# Patient Record
Sex: MALE | Race: WHITE
[De-identification: names, ages, dates, MRNs, and addresses within clinical notes are randomized per-mention and may not be internally consistent; named-entity substitution may affect disease eponyms.]

---

## 2017-02-02 ENCOUNTER — HOSPITAL ENCOUNTER (OUTPATIENT)
Dept: HOSPITAL 47 - MMGSC | Age: 57
Discharge: HOME | End: 2017-02-02
Payer: MEDICAID

## 2017-02-02 DIAGNOSIS — Z87.39: ICD-10-CM

## 2017-02-02 DIAGNOSIS — I10: ICD-10-CM

## 2017-02-02 DIAGNOSIS — E11.9: Primary | ICD-10-CM

## 2017-02-02 DIAGNOSIS — E78.00: ICD-10-CM

## 2017-02-02 LAB
ALP SERPL-CCNC: 74 U/L (ref 38–126)
ALT SERPL-CCNC: 56 U/L (ref 21–72)
ANION GAP SERPL CALC-SCNC: 15 MMOL/L
AST SERPL-CCNC: 32 U/L (ref 17–59)
BASOPHILS # BLD AUTO: 0.1 K/UL (ref 0–0.2)
BASOPHILS NFR BLD AUTO: 1 %
BUN SERPL-SCNC: 13 MG/DL (ref 9–20)
CALCIUM SPEC-MCNC: 10 MG/DL (ref 8.4–10.2)
CH: 29.9
CHCM: 34.2
CHLORIDE SERPL-SCNC: 104 MMOL/L (ref 98–107)
CHOLEST SERPL-MCNC: 171 MG/DL (ref ?–200)
CO2 SERPL-SCNC: 23 MMOL/L (ref 22–30)
EOSINOPHIL # BLD AUTO: 0.3 K/UL (ref 0–0.7)
EOSINOPHIL NFR BLD AUTO: 4 %
ERYTHROCYTE [DISTWIDTH] IN BLOOD BY AUTOMATED COUNT: 4.93 M/UL (ref 4.3–5.9)
ERYTHROCYTE [DISTWIDTH] IN BLOOD: 13.3 % (ref 11.5–15.5)
GLUCOSE SERPL-MCNC: 192 MG/DL (ref 74–99)
HCT VFR BLD AUTO: 43.3 % (ref 39–53)
HDLC SERPL-MCNC: 47 MG/DL (ref 40–60)
HDW: 2.77
HEMOGLOBIN A1C: 8.9 % (ref 4.2–6.1)
HGB BLD-MCNC: 14.5 GM/DL (ref 13–17.5)
LUC NFR BLD AUTO: 2 %
LYMPHOCYTES # SPEC AUTO: 2.2 K/UL (ref 1–4.8)
LYMPHOCYTES NFR SPEC AUTO: 30 %
MCH RBC QN AUTO: 29.4 PG (ref 25–35)
MCHC RBC AUTO-ENTMCNC: 33.5 G/DL (ref 31–37)
MCV RBC AUTO: 87.9 FL (ref 80–100)
MONOCYTES # BLD AUTO: 0.4 K/UL (ref 0–1)
MONOCYTES NFR BLD AUTO: 5 %
NEUTROPHILS # BLD AUTO: 4.2 K/UL (ref 1.3–7.7)
NEUTROPHILS NFR BLD AUTO: 58 %
NON-AFRICAN AMERICAN GFR(MDRD): >60
POTASSIUM SERPL-SCNC: 4.5 MMOL/L (ref 3.5–5.1)
PROT SERPL-MCNC: 7.9 G/DL (ref 6.3–8.2)
SODIUM SERPL-SCNC: 142 MMOL/L (ref 137–145)
TRIGL SERPL-MCNC: 167 MG/DL (ref ?–150)
URATE SERPL-MCNC: 6.9 MG/DL (ref 3.5–8.5)
WBC # BLD AUTO: 0.15 10*3/UL
WBC # BLD AUTO: 7.2 K/UL (ref 3.8–10.6)
WBC (PEROX): 7.27

## 2017-02-02 PROCEDURE — 80061 LIPID PANEL: CPT

## 2017-02-02 PROCEDURE — 84439 ASSAY OF FREE THYROXINE: CPT

## 2017-02-02 PROCEDURE — 84550 ASSAY OF BLOOD/URIC ACID: CPT

## 2017-02-02 PROCEDURE — 82043 UR ALBUMIN QUANTITATIVE: CPT

## 2017-02-02 PROCEDURE — 84443 ASSAY THYROID STIM HORMONE: CPT

## 2017-02-02 PROCEDURE — 80053 COMPREHEN METABOLIC PANEL: CPT

## 2017-02-02 PROCEDURE — 85025 COMPLETE CBC W/AUTO DIFF WBC: CPT

## 2017-02-02 PROCEDURE — 83036 HEMOGLOBIN GLYCOSYLATED A1C: CPT

## 2017-02-02 PROCEDURE — 36415 COLL VENOUS BLD VENIPUNCTURE: CPT

## 2017-11-06 ENCOUNTER — HOSPITAL ENCOUNTER (OUTPATIENT)
Dept: HOSPITAL 47 - MMGSC | Age: 57
Discharge: HOME | End: 2017-11-06
Payer: MEDICAID

## 2017-11-06 DIAGNOSIS — E78.5: ICD-10-CM

## 2017-11-06 DIAGNOSIS — E11.9: Primary | ICD-10-CM

## 2017-11-06 LAB
ALP SERPL-CCNC: 82 U/L (ref 38–126)
ALT SERPL-CCNC: 68 U/L (ref 21–72)
ANION GAP SERPL CALC-SCNC: 13 MMOL/L
AST SERPL-CCNC: 30 U/L (ref 17–59)
BASOPHILS # BLD AUTO: 0.1 K/UL (ref 0–0.2)
BASOPHILS NFR BLD AUTO: 1 %
BUN SERPL-SCNC: 12 MG/DL (ref 9–20)
CALCIUM SPEC-MCNC: 9.8 MG/DL (ref 8.4–10.2)
CH: 29.6
CHCM: 33.3
CHLORIDE SERPL-SCNC: 98 MMOL/L (ref 98–107)
CHOLEST SERPL-MCNC: 213 MG/DL (ref ?–200)
CO2 SERPL-SCNC: 25 MMOL/L (ref 22–30)
EOSINOPHIL # BLD AUTO: 0.4 K/UL (ref 0–0.7)
EOSINOPHIL NFR BLD AUTO: 7 %
ERYTHROCYTE [DISTWIDTH] IN BLOOD BY AUTOMATED COUNT: 5.18 M/UL (ref 4.3–5.9)
ERYTHROCYTE [DISTWIDTH] IN BLOOD: 13 % (ref 11.5–15.5)
GLUCOSE SERPL-MCNC: 303 MG/DL (ref 74–99)
HCT VFR BLD AUTO: 46.1 % (ref 39–53)
HDLC SERPL-MCNC: 43 MG/DL (ref 40–60)
HDW: 2.82
HGB BLD-MCNC: 15 GM/DL (ref 13–17.5)
LUC NFR BLD AUTO: 2 %
LYMPHOCYTES # SPEC AUTO: 2.2 K/UL (ref 1–4.8)
LYMPHOCYTES NFR SPEC AUTO: 33 %
MCH RBC QN AUTO: 29 PG (ref 25–35)
MCHC RBC AUTO-ENTMCNC: 32.6 G/DL (ref 31–37)
MCV RBC AUTO: 89.1 FL (ref 80–100)
MONOCYTES # BLD AUTO: 0.4 K/UL (ref 0–1)
MONOCYTES NFR BLD AUTO: 6 %
NEUTROPHILS # BLD AUTO: 3.5 K/UL (ref 1.3–7.7)
NEUTROPHILS NFR BLD AUTO: 53 %
NON-AFRICAN AMERICAN GFR(MDRD): >60
POTASSIUM SERPL-SCNC: 4.7 MMOL/L (ref 3.5–5.1)
PROT SERPL-MCNC: 7.5 G/DL (ref 6.3–8.2)
SODIUM SERPL-SCNC: 136 MMOL/L (ref 137–145)
WBC # BLD AUTO: 0.1 10*3/UL
WBC # BLD AUTO: 6.6 K/UL (ref 3.8–10.6)
WBC (PEROX): 6.2

## 2017-11-06 PROCEDURE — 36415 COLL VENOUS BLD VENIPUNCTURE: CPT

## 2017-11-06 PROCEDURE — 80053 COMPREHEN METABOLIC PANEL: CPT

## 2017-11-06 PROCEDURE — 80061 LIPID PANEL: CPT

## 2017-11-06 PROCEDURE — 85025 COMPLETE CBC W/AUTO DIFF WBC: CPT

## 2017-11-06 PROCEDURE — 83036 HEMOGLOBIN GLYCOSYLATED A1C: CPT

## 2017-11-06 PROCEDURE — 84439 ASSAY OF FREE THYROXINE: CPT

## 2017-11-06 PROCEDURE — 84443 ASSAY THYROID STIM HORMONE: CPT

## 2017-11-10 ENCOUNTER — HOSPITAL ENCOUNTER (OUTPATIENT)
Dept: HOSPITAL 47 - RADNMMAIN | Age: 57
Discharge: HOME | End: 2017-11-10
Payer: MEDICAID

## 2017-11-10 DIAGNOSIS — C41.9: Primary | ICD-10-CM

## 2017-11-10 PROCEDURE — 78306 BONE IMAGING WHOLE BODY: CPT

## 2017-11-10 NOTE — NM
EXAMINATION TYPE: NM bone scan whole body

 

DATE OF EXAM: 11/10/2017

 

COMPARISON: NONE

 

HISTORY: Malignant neoplasm of bone

 

Delayed whole-body scanning was performed following the injection of 24.3 mCi Tc 99m MDP.  Images wer
e acquired 4 hours post injection.

 

FINDINGS: 

 

There is normal uptake within urinary bladder. Some contamination overlies the lower pelvic soft tiss
ues. There may be some degenerative change within the shoulders and knees

 

Spot imaging is performed over the upper thorax. The right proximal diaphyseal humerus has a focal ar
ea of marked increased uptake suspicious for metastatic disease. Correlate for trauma.

 

Small focal area of uptake is within the mid right foot likely degenerative

 

IMPRESSION:

1. Focal suspicious area for neoplasm within the proximal diaphyseal right humerus.

## 2018-03-07 ENCOUNTER — HOSPITAL ENCOUNTER (OUTPATIENT)
Dept: HOSPITAL 47 - MMGSC | Age: 58
Discharge: HOME | End: 2018-03-07
Payer: MEDICAID

## 2018-03-07 DIAGNOSIS — E11.9: Primary | ICD-10-CM

## 2018-03-07 PROCEDURE — 36415 COLL VENOUS BLD VENIPUNCTURE: CPT

## 2018-03-07 PROCEDURE — 83036 HEMOGLOBIN GLYCOSYLATED A1C: CPT

## 2018-03-08 LAB — HBA1C MFR BLD: 8.2 % (ref 4–6)

## 2020-07-23 ENCOUNTER — HOSPITAL ENCOUNTER (OUTPATIENT)
Dept: HOSPITAL 47 - LABWHC1 | Age: 60
Discharge: HOME | End: 2020-07-23
Attending: FAMILY MEDICINE
Payer: COMMERCIAL

## 2020-07-23 DIAGNOSIS — R19.7: ICD-10-CM

## 2020-07-23 DIAGNOSIS — R53.83: ICD-10-CM

## 2020-07-23 DIAGNOSIS — R11.0: ICD-10-CM

## 2020-07-23 DIAGNOSIS — R06.02: Primary | ICD-10-CM

## 2020-11-30 ENCOUNTER — HOSPITAL ENCOUNTER (EMERGENCY)
Dept: HOSPITAL 47 - EC | Age: 60
Discharge: HOME | End: 2020-11-30
Payer: COMMERCIAL

## 2020-11-30 VITALS
DIASTOLIC BLOOD PRESSURE: 78 MMHG | RESPIRATION RATE: 18 BRPM | TEMPERATURE: 98.6 F | HEART RATE: 58 BPM | SYSTOLIC BLOOD PRESSURE: 129 MMHG

## 2020-11-30 DIAGNOSIS — M25.511: ICD-10-CM

## 2020-11-30 DIAGNOSIS — Z85.830: ICD-10-CM
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
## 2020-11-30 DIAGNOSIS — Z91.013: ICD-10-CM

## 2020-11-30 DIAGNOSIS — S42.301A: Primary | ICD-10-CM

## 2020-11-30 DIAGNOSIS — Z98.890: ICD-10-CM

## 2020-11-30 PROCEDURE — 96372 THER/PROPH/DIAG INJ SC/IM: CPT

## 2020-11-30 PROCEDURE — 73030 X-RAY EXAM OF SHOULDER: CPT

## 2020-11-30 PROCEDURE — 99283 EMERGENCY DEPT VISIT LOW MDM: CPT

## 2020-11-30 NOTE — ED
Extremity Problem HPI





- General


Chief complaint: Extremity Problem,Nontraumatic


Stated complaint: rt shoulder injury


Time Seen by Provider: 11/30/20 11:36


Source: patient


Mode of arrival: ambulatory


Limitations: no limitations





- History of Present Illness


Initial comments: 





60-year-old male patient presents to the emergency department today for 

evaluation of right shoulder pain and stiffness.  Patient states he has had 

cancer to the humerus and has had multiple reconstructive surgeries with cadaver

bone and hardware.  Patient states that the last 8 months he has been having 

increased pain and difficulty with the arm.  He states over the last 2-3 weeks 

has been worsening.  Patient states today the muscles seem to seize up and he is

lost range of motion to the arm.  Denies any numbness or tingling.  Denies 

swelling to the hand.  Denies any known injury.  Denies any fever or chills.  

Denies redness over the joint. Patient denies any recent rash, cough, shortness 

of breath, chest pain, abdominal pain, nausea, vomiting, diarrhea, constipation,

back pain, numbness, tingling, dizziness, weakness, hematuria, dysuria, urinary 

urgency, urinary frequency, headache, visual changes, or any other complaints.





- Related Data


                                  Previous Rx's











 Medication  Instructions  Recorded


 


Cyclobenzaprine [Flexeril] 10 mg PO TID #15 tab 11/30/20











                                    Allergies











Allergy/AdvReac Type Severity Reaction Status Date / Time


 


shellfish derived [Shellfish] Allergy  Anaphylaxis Verified 11/30/20 11:24














Review of Systems


ROS Statement: 


Those systems with pertinent positive or pertinent negative responses have been 

documented in the HPI.





ROS Other: All systems not noted in ROS Statement are negative.





Past Medical History


Past Medical History: Atrial Fibrillation, Cancer, Diabetes Mellitus, 

Hypertension


Additional Past Medical History / Comment(s): [place], [place]


History of Any Multi-Drug Resistant Organisms: None Reported


Past Surgical History: Orthopedic Surgery


Additional Past Surgical History / Comment(s): implant in right arm, cadaver 

bone, ablation


Past Psychological History: No Psychological Hx Reported


Past Alcohol Use History: None Reported


Past Drug Use History: None Reported





General Exam


Limitations: no limitations


General appearance: alert, in no apparent distress, other (This is a well-

developed, well-nourished adult male patient in no acute distress.  Vital signs 

upon presentation are temperature 98.6F, pulse 58, respirations 18, blood 

pressure 129/78, pulse ox 98% on room air.)


Respiratory exam: Present: normal lung sounds bilaterally.  Absent: respiratory 

distress, wheezes, rales, rhonchi, stridor


Cardiovascular Exam: Present: regular rate, normal rhythm, normal heart sounds. 

 Absent: systolic murmur, diastolic murmur, rubs, gallop, clicks


Extremities exam: Present: normal inspection, full ROM, normal capillary refill,

 other (Skin to the right arm is pink, warm, dry.  Cap refills less than 3 

seconds.  Radial pulses 2+ and equal bilaterally.  To manage range of motion to 

the right shoulder.).  Absent: tenderness, pedal edema, joint swelling, calf 

tenderness


Neurological exam: Present: alert, oriented X3, CN II-XII intact


Psychiatric exam: Present: normal affect, normal mood


Skin exam: Present: warm, dry, intact, normal color.  Absent: rash





Course


                                   Vital Signs











  11/30/20





  11:19


 


Temperature 98.6 F


 


Pulse Rate 58 L


 


Respiratory 18





Rate 


 


Blood Pressure 129/78


 


O2 Sat by Pulse 98





Oximetry 














Medical Decision Making





- Medical Decision Making


60-year-old male patient presents to the emergency department today for 

evaluation of right shoulder pain.  Physical examination did reveal normal 

neurovascular status with limited range of motion.  X-ray was obtained of the 

right shoulder which demonstrated nonunion fracture of the right humerus with 

hardware loosening.  There is possibly a new periprosthetic fracture.  The 

patient does see Dr. Michelle at [facility].  Patient will be discharged 

with pain medication muscle relaxer.  He is instructed follow-up with his 

orthopedic specialist as soon as possible.  Return parameters discussed in 

detail.  He verbalizes understanding and agrees with this plan.








- Radiology Data


Radiology results: report reviewed, image reviewed


Multiple views of the right shoulder obtained.  Report was reviewed in its 

entirety.  Impression by  shows prior intramedullary nailing inside 

plate/fixation along the proximal half of the humerus.  There is Chronic 

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 

development of a pseudoarthrosis.  They chronic nonunion loss or abnormal 

movement and pseudoarticulation at the fracture site and secondary krystina 

hardware loosening.  Unable to exclude a subtle, nondisplaced cortical 

periprosthetic fracture at the proximal asked back of the right adjacent to the 

greater tuberosity.





Disposition


Clinical Impression: 


 Right shoulder pain





Disposition: HOME SELF-CARE


Condition: Good


Instructions (If sedation given, give patient instructions):  Shoulder Pain (ED)


Additional Instructions: 


Follow-up with your orthopedic specialist as soon as possible.  Follow-up th

rough primary care physician for recheck in 1-2 days.  Return to the emergency 

department immediately for any new, worsening, or concerning symptoms.


Prescriptions: 


Cyclobenzaprine [Flexeril] 10 mg PO TID #15 tab


Is patient prescribed a controlled substance at d/c from ED?: No


Referrals: 


Brit Mcdonald MD [Primary Care Provider] - 1-2 days


Time of Disposition: 13:10

## 2022-02-09 ENCOUNTER — HOSPITAL ENCOUNTER (OUTPATIENT)
Dept: HOSPITAL 47 - SLEEP | Age: 62
End: 2022-02-09
Attending: INTERNAL MEDICINE
Payer: COMMERCIAL

## 2022-02-09 DIAGNOSIS — Z90.09: ICD-10-CM

## 2022-02-09 DIAGNOSIS — I10: ICD-10-CM

## 2022-02-09 DIAGNOSIS — K76.9: ICD-10-CM

## 2022-02-09 DIAGNOSIS — Z79.4: ICD-10-CM

## 2022-02-09 DIAGNOSIS — Z79.899: ICD-10-CM

## 2022-02-09 DIAGNOSIS — Z86.59: ICD-10-CM

## 2022-02-09 DIAGNOSIS — E66.9: ICD-10-CM

## 2022-02-09 DIAGNOSIS — Z79.51: ICD-10-CM

## 2022-02-09 DIAGNOSIS — I48.91: ICD-10-CM

## 2022-02-09 DIAGNOSIS — R41.3: ICD-10-CM

## 2022-02-09 DIAGNOSIS — E78.5: ICD-10-CM

## 2022-02-09 DIAGNOSIS — G25.81: ICD-10-CM

## 2022-02-09 DIAGNOSIS — Z79.84: ICD-10-CM

## 2022-02-09 DIAGNOSIS — G47.33: Primary | ICD-10-CM

## 2022-02-09 DIAGNOSIS — J45.909: ICD-10-CM

## 2022-02-09 DIAGNOSIS — F32.A: ICD-10-CM

## 2022-02-09 DIAGNOSIS — Z91.013: ICD-10-CM

## 2022-02-09 DIAGNOSIS — E11.9: ICD-10-CM

## 2022-02-09 DIAGNOSIS — Z96.611: ICD-10-CM

## 2022-02-09 DIAGNOSIS — Z85.830: ICD-10-CM

## 2022-02-09 DIAGNOSIS — Z99.89: ICD-10-CM

## 2022-02-09 PROCEDURE — 99211 OFF/OP EST MAY X REQ PHY/QHP: CPT

## 2022-02-09 NOTE — CONS
CONSULTATION



DATE OF SERVICE:

02/09/2022



This 61-year-old gentleman has been evaluated in Sleep Center for obstructive 
sleep

apnea-hypopnea syndrome.



HISTORY OF PRESENT ILLNESS/SLEEP-WAKE EVALUATION:

Patient was diagnosed with obstructive sleep apnea about 18 years ago.  Over all
these

years he has continued to use his CPAP equipment and he cannot sleep without the

machine. Recently while using his CPAP unit he was experiencing bloating gas in 
his

stomach. He said that it happened some period of time before, but now is 
significantly

more.



His sleep schedule is from 10 p.m. to 9 or 9:30 a.m. Sometimes he does have 
problems

with falling asleep, although no TV in bedroom. He usually sleeps on the side 
position.

He wakes up from sleep 6 times with 4 episodes of nocturia.  He grinds his 
teeth, has

dry mouth, panic attacks, restless legs. In the morning the patient wakes up 
tired, has

difficulties paying attention, worries about his sleep.  [place] Sleepiness 
Scale is 8.



His weight has decreased over the last 10 years from 299 pounds to 259 pounds 
today.



The patient did not bring in his CPAP unit.



According to family, his CPAP unit is more than 5 years old.

I checked CPAP unit, pressure is 12 cm H2O, no information about AHI.



PAST MEDICAL HISTORY:

Positive for atrial fibrillation, hypertension, sarcoma of the bone, depression,
panic

attacks, diabetes mellitus, hyperlipidemia, liver problems.



PAST SURGICAL HISTORY:

Right shoulder replacement, tonsillectomy, cardioversion x2, cardiac ablation 
and

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



MEDICATIONS:

1. Victoza 1.8 mg once a day.

2. Lantus.

3. Albuterol inhaler.

4. Famotidine.

5. Eliquis.

6. Metformin.

7. _____.

8. Amlodipine.

9. Atorvastatin.

10.Glimepiride.

11.Alprazolam.

12.Magnesium supplement.



FAMILY HISTORY:

Heart problems, hypertension, sleep apnea, death during sleep.



SOCIAL HISTORY:

Positive history of smoking 2 to 3 packs per day; quit smoking more than 22 
years ago.

Alcohol consumption occasional.



REVIEW OF SYSTEMS:

Gas, bloating while using his CPAP, awakenings from sleep.



No fevers.  No double vision.  No recent chest pain.  No shortness of breath.  
No

abdominal pain.  No bleeding episodes.  No blood in the urine.  No seizure 
episodes.



PHYSICAL EXAMINATION:

GENERAL: Pleasant  gentleman without distress.

VITAL SIGNS: /71, HR 58, RR 15, height 5 feet 9-1/2 inches, weight 259 
pounds,

body mass index 37.6, temperature 97.2, oxygen saturation at room air 96%.

HEENT: PERRLA, EOMI, evaluation of oropharynx showed tongue protrudes midline.

Moderately low position of soft palate; Mallampati II to III. Big uvula.

NECK:  Supple, no JVD.  Thyroid is not palpable. Wide neck; 20-3/4 inches in

circumference.

LUNGS:  Clear to percussion and to auscultation.  Good air exchange.  No 
wheezing or

rhonchi.

HEART:  S1, S2 irregular.

ABDOMEN:  Obese.

EXTREMITIES: No clubbing or cyanosis.

CNS:  Awake, alert, and oriented X3.  Cranial nerves 2 to 7 intact.  There is no

fasciculation or atrophy. noted.  No focal deficits observed.



IMPRESSION:

1. History of obstructive sleep apnea-hypopnea syndrome for 18 years.  The 
patient

    continues to use CPAP equipment every night for the whole night.  Recently 
he

    developed gas bloating while using his CPAP.  He has moderately low position
of

    soft palate, extremely wide neck, 20-3/4 inches in circumference; 
obstructive sleep

    apnea-hypopnea syndrome.

2. Obesity; body mass index 37.6.

3. History of restless leg symptoms.

4. Hypertension.

5. Asthma.

6. Atrial fibrillation, status post cardiac ablation and cardioversion.

7. History of bone sarcoma of right upper arm.

8. History of depression.

9. History of panic attacks.

10.Diabetes mellitus.

11.Status post right shoulder replacement.

12.Status post tonsillectomy.

13.Hyperlipidemia.

14.Memory problems.

15.Liver problems.



PLAN:

1. To obtain results of previous sleep studies.

2. CPAP titration for re-evaluation of effective CPAP pressure for correction of

    respiratory abnormalities and to find a proper mask.

3.  I adjusted CPAP pressure down from 12 cm H2O to 10 cm H2O; possibly gas

    bloating is related to pressure in the machine.

4. After titration the patient will get a new CPAP unit.

5. Losing weight.

6. No driving if feeling any sleepiness.

Thank you very much for referring this patient for consultation.



Sincerely,







Pola Rodriguez, MD, PhD, FAASM

Diplomat of American Board of Medical Specialties

Sleep Medicine Board of American Board of Internal Medicine

Medical Director of [facility]





MMLEODAN / TE: [id number] / Job#: [id number]

MTDASTER

## 2022-05-20 ENCOUNTER — HOSPITAL ENCOUNTER (OUTPATIENT)
Dept: HOSPITAL 47 - PROCWHC3 | Age: 62
End: 2022-05-20
Attending: FAMILY MEDICINE
Payer: COMMERCIAL

## 2022-05-20 VITALS — HEART RATE: 50 BPM | SYSTOLIC BLOOD PRESSURE: 124 MMHG | DIASTOLIC BLOOD PRESSURE: 62 MMHG | RESPIRATION RATE: 16 BRPM

## 2022-05-20 VITALS — TEMPERATURE: 97.7 F

## 2022-05-20 DIAGNOSIS — U07.1: Primary | ICD-10-CM

## 2022-05-20 DIAGNOSIS — E66.9: ICD-10-CM

## 2022-05-20 DIAGNOSIS — E11.9: ICD-10-CM

## 2022-05-20 DIAGNOSIS — Z91.013: ICD-10-CM

## 2022-05-20 PROCEDURE — 96374 THER/PROPH/DIAG INJ IV PUSH: CPT

## 2022-06-01 ENCOUNTER — HOSPITAL ENCOUNTER (OUTPATIENT)
Dept: HOSPITAL 47 - SLEEP | Age: 62
End: 2022-06-01
Attending: INTERNAL MEDICINE
Payer: COMMERCIAL

## 2022-06-01 DIAGNOSIS — K76.9: ICD-10-CM

## 2022-06-01 DIAGNOSIS — F41.0: ICD-10-CM

## 2022-06-01 DIAGNOSIS — Z79.51: ICD-10-CM

## 2022-06-01 DIAGNOSIS — J45.909: ICD-10-CM

## 2022-06-01 DIAGNOSIS — Z98.890: ICD-10-CM

## 2022-06-01 DIAGNOSIS — Z85.830: ICD-10-CM

## 2022-06-01 DIAGNOSIS — Z79.01: ICD-10-CM

## 2022-06-01 DIAGNOSIS — Z79.84: ICD-10-CM

## 2022-06-01 DIAGNOSIS — E11.9: ICD-10-CM

## 2022-06-01 DIAGNOSIS — Z90.09: ICD-10-CM

## 2022-06-01 DIAGNOSIS — Z91.013: ICD-10-CM

## 2022-06-01 DIAGNOSIS — Z96.611: ICD-10-CM

## 2022-06-01 DIAGNOSIS — I10: ICD-10-CM

## 2022-06-01 DIAGNOSIS — Z79.899: ICD-10-CM

## 2022-06-01 DIAGNOSIS — R41.3: ICD-10-CM

## 2022-06-01 DIAGNOSIS — F32.A: ICD-10-CM

## 2022-06-01 DIAGNOSIS — G47.33: Primary | ICD-10-CM

## 2022-06-01 DIAGNOSIS — I48.91: ICD-10-CM

## 2022-06-01 DIAGNOSIS — E66.9: ICD-10-CM

## 2022-06-01 NOTE — SFUN
SLEEP CENTER FOLLOW UP NOTE



DATE OF SERVICE:

06/01/2022



This 62-year-old gentleman has been followed in Sleep Center for treatment of

obstructive sleep apnea-hypopnea syndrome.



During consultation in February 2022, the patient complained of a feeling of bloating

in his belly while he was using CPAP equipment.  We proceeded with CPAP titration and

subsequently the patient received a new machine. Today is his first visit on the new

BiPAP unit.



The patient is able to use BiPAP equipment every night, and he has no bloating

sensation while he is using BiPAP.



I checked his BiPAP unit. Usage is 100% of nights, and 97% of nights for more than 4

hours, average usage 8 hours 24 minutes, which is great compliance.  Maximal

inspiratory pressure 20, minimal expiratory pressure 6, pressure support 4.  Apnea-

hypopnea index is only 1.5, which is absolutely normal. Leak is borderline in 95%,

39.2.



MEDICATIONS:

Victoza, Lantus, albuterol, famotidine, Eliquis, metformin, _____, amlodipine,

atorvastatin, glimepiride, alprazolam.



PHYSICAL EXAMINATION:

GENERAL: Pleasant patient in no distress.

VITAL SIGNS: /69, HR 60, RR 16, weight 258.0, temperature 96.8, oxygen saturation

at room air 97%.

HEENT: PERRLA, EOMI, evaluation of oropharynx showed tongue protrudes midline.

Moderately low position of soft palate; Mallampati II to III.

NECK:  Supple, no JVD.  Thyroid is not palpable.

LUNGS:  Clear to percussion and to auscultation.  Good air exchange.  No wheezing or

rhonchi.

HEART:  S1, S2 regular.

ABDOMEN:  Obese.

EXTREMITIES: No clubbing or cyanosis.

CNS:  Awake, alert, and oriented X3.  Cranial nerves 2 to 7 intact.  There is no

fasciculation or atrophy. noted.  No focal deficits observed.



IMPRESSION:

1. Obstructive sleep apnea-hypopnea syndrome. Patient demonstrated 100% compliance

    with treatment, benefitting from treatment. Normal respiration on BiPAP.  No

    feeling of gas bloating after getting new equipment with adjusted pressure.

2. History of atrial fibrillation, status post cardiac ablation and cardioversion.

    Regular heartbeats.

3. Obesity.

4. Hypertension.

5. Asthma.

6. History of bone sarcoma in the right upper arm.

7. History of depression.

8. History of panic attack.

9. Diabetes mellitus.

10.Status post right shoulder replacement.

11.Status post tonsillectomy.

12.Hyperlipidemia.

13.Memory problems.

14.Liver problems.



PLAN:

1. Patient will continue to use PAP equipment every night for the whole night.

2. Sleep hygiene with regular time in bed for at least 7-1/2 to 8 hours.

3. Precautions related to driving. No driving if feeling sleepiness.

4. I will maintain all necessary prescription for PAP supplies including mask, tube,

    filters.

5. Watching weight.

6. Follow-up visit in 6 months or earlier if patient has any problems.



Thank you very much for allowing me to participate in the management of your patient.



Sincerely,







Pola Rodriguez MD, PhD, FAASM

Diplomat of American Board of Medical Specialties

Sleep Medicine Board of American Board of Internal Medicine

Medical Director of [facility]





MMLEODAN / TE: [id number] / Job#: [id number]

## 2022-12-14 ENCOUNTER — HOSPITAL ENCOUNTER (OUTPATIENT)
Dept: HOSPITAL 47 - SLEEP | Age: 62
End: 2022-12-14
Payer: COMMERCIAL

## 2022-12-14 DIAGNOSIS — J45.909: ICD-10-CM

## 2022-12-14 DIAGNOSIS — Z91.013: ICD-10-CM

## 2022-12-14 DIAGNOSIS — Z96.611: ICD-10-CM

## 2022-12-14 DIAGNOSIS — Z79.84: ICD-10-CM

## 2022-12-14 DIAGNOSIS — G47.33: Primary | ICD-10-CM

## 2022-12-14 DIAGNOSIS — Z99.89: ICD-10-CM

## 2022-12-14 DIAGNOSIS — E78.5: ICD-10-CM

## 2022-12-14 DIAGNOSIS — E11.9: ICD-10-CM

## 2022-12-14 DIAGNOSIS — Z86.59: ICD-10-CM

## 2022-12-14 DIAGNOSIS — Z85.831: ICD-10-CM

## 2022-12-14 DIAGNOSIS — E66.9: ICD-10-CM

## 2022-12-14 DIAGNOSIS — R41.3: ICD-10-CM

## 2022-12-14 DIAGNOSIS — Z86.79: ICD-10-CM

## 2022-12-14 PROCEDURE — 99212 OFFICE O/P EST SF 10 MIN: CPT

## 2022-12-14 NOTE — P.PN
Subjective


DATE: 12/14/2022





FOLLOW UP VISIT.





Patient with obstructive sleep apnea hypopnea syndrome return to sleep center 

for follow-up visit. 


Information from previous visit have been reviewed.  


 Patient is using BPAP equipment every night for the whole night, getting BPAP 

supplies in time.


The patient does not have significant problems with the mask, BPAP unit and 

humidification. [place] sleepiness scale is 5, which is normal.


I checked information from BPAP unit. 


BPAP unit pressure maximal inspiratory pressure 20, minimal expiratory 6, p

ressure-support 4, average pressure 17.7/13.7 cm H2O. 


Usage is 100 % for more then 4 hours, average 8.75 hours per night. 


Leak is 18.9 l/m, which is in acceptable range. 


Apnea Hypopnea Index is 3.0, which is normal.  


Sometimes patient wakes up from sleep because of high pressure in BiPAP unit.





MEDICATIONS:1.  Lantus


                          2.  Victoza


                          3.  Amlodipine 200 mg once a day


                          4.  Metformin 1000 mg once a day


                          5.  Atorvastatin 40 mg once a day


                          6.  Amlodipine/benazepril 5-20 mg once a day


                          7.  Eliquis 5 mg twice a day


                          8.  Lamotrigine 200 mg twice a day





During physical exam:


GENERAL: A pleasant patient without any distress. 


VITAL SIGNS: /72, HR 67, RR 16 , weight 251.4, temperature 97.1, oxygen 

saturation at room air 98 % .


HEENT: PERRLA, EOMI.low position of soft palate, Mallapati2-3 .


NECK: Supple. No JVD. 


LUNGS: Clear to percussion and to auscultation. Good air exchange. No wheezing 

or rhonchi. 


HEART: S1, S2 regular. 


ABDOMEN: Soft and nontender.  Obese  


EXTREMITIES: No clubbing or cyanosis. 


CNS: Awake, alert, and oriented x3.  No focal deficit. 





Impressions:


1.  Obstructive sleep apnea-hypopnea syndrome. Patient demonstrated great 

compliance with treatment, benefiting from treatment.


2.  History of atrial fibrillation.


3.  Obesity.


4.  Hypertension.


5.  Asthma.


6.  History of sarcoma over the bone in the right upper arm.


7.  History of depression.


8.  History of panic attack.


9.  Diabetes mellitus.


10.  Later problems.


11.  Memory problems.


12.  Hyperlipidemia


13 status post right shoulder replacement.








Plan:


1.  Continue using BPAP equipment every night for the whole night.  I decrease 

maximal inspiratory pressure to 18 cm of water.


2.  To change air filter at least 1-2 times per month.


3.  PAP unit should stay lower then position of the head.


4.  Advised patient to remove all remaining water from humidifier canister daily

and make it dry after each usage. Refill canister with fresh distilled water 

before each usage. 


5.  Sleep hygiene with regular time in bed for at least 8 hours.


6.  Precautions related to driving. No driving if feel any sleepiness.


7.  I will maintain prescription for PAP supplies including mask, tube, filters.


8. Follow up visit in 6 months or earlier if patient has any problems.


9. Watching and losing weight.








Thank you very much for allowing me to participate in the management of your 

patient.








Pola Rodriguez MD, PhD, FAASM.


Diplomat of American Board of Sleep Medicine,


Sleep Medicine Board by American Board of Internal Medicine


Medical Director of [facility]

## 2023-11-12 NOTE — XR
EXAMINATION TYPE: XR shoulder complete RT

 

DATE OF EXAM: 11/30/2020

 

Comparison: Old MRI 4/2/2012

 

Clinical History: 60-year-old male Pain, immobility

 

Findings:

There is antegrade intramedullary nail fixation. Additional sideplate and cortical screw fixation delfina
ng the proximal shaft. 2 screws are also present within the humeral head region. There is a chronic n
onunited oblique proximal shaft fracture with a pseudoarthrosis. There is krystina loosening along the p
roximal aspect of the sideplate and krystina loosening along both proximal and distal aspects of the int
ramedullary nail. Proximally, there is up to 1.4 cm of periprosthetic lucency. Distally, there is up 
to 5 mm of periprosthetic lucency. Medial apex angulation is noted. Unable to exclude a subtle peripr
osthetic, cortical fracture along the medial margin of the greater tuberosity adjacent to the peripro
sthetic lucency, refer to the Grashey and scapular Y images as indicated by the arrows. Degenerative 
articular surface irregularity of the glenoid. Moderate degenerative change at the AC joint. Visualiz
ed right hemithorax is clear.

 

 

Impression:

 

1. Prior intramedullary nailing and sideplate/screw fixation along the proximal half of the humerus. 
There is chronic nonunion across the oblique fracture of the proximal humeral shaft with the developm
ent of a pseudoarthrosis.

2. The chronic nonunion allows for abnormal movement and pseudoarticulation at the fracture site and 
secondary krystina hardware loosening.

3. Unable to exclude a subtle, nondisplaced cortical periprosthetic fracture at the proximal aspect o
f the carol adjacent to the greater tuberosity (refer to the arrows on the Grashey and scapular Y views
).
1 or 2